# Patient Record
Sex: FEMALE
[De-identification: names, ages, dates, MRNs, and addresses within clinical notes are randomized per-mention and may not be internally consistent; named-entity substitution may affect disease eponyms.]

---

## 2017-08-15 PROBLEM — Z00.00 ENCOUNTER FOR PREVENTIVE HEALTH EXAMINATION: Status: ACTIVE | Noted: 2017-08-15

## 2017-09-09 ENCOUNTER — APPOINTMENT (OUTPATIENT)
Dept: OBGYN | Facility: CLINIC | Age: 37
End: 2017-09-09
Payer: COMMERCIAL

## 2017-09-09 VITALS
DIASTOLIC BLOOD PRESSURE: 56 MMHG | WEIGHT: 174 LBS | HEIGHT: 64 IN | BODY MASS INDEX: 29.71 KG/M2 | SYSTOLIC BLOOD PRESSURE: 110 MMHG

## 2017-09-09 DIAGNOSIS — Z83.3 FAMILY HISTORY OF DIABETES MELLITUS: ICD-10-CM

## 2017-09-09 DIAGNOSIS — Z78.9 OTHER SPECIFIED HEALTH STATUS: ICD-10-CM

## 2017-09-09 DIAGNOSIS — A74.9 CHLAMYDIAL INFECTION, UNSPECIFIED: ICD-10-CM

## 2017-09-09 LAB
BILIRUB UR QL STRIP: NORMAL
GLUCOSE UR-MCNC: NORMAL
HCG UR QL: 0.2 EU/DL
HCG UR QL: POSITIVE
HGB UR QL STRIP.AUTO: NORMAL
KETONES UR-MCNC: NORMAL
LEUKOCYTE ESTERASE UR QL STRIP: NORMAL
NITRITE UR QL STRIP: NORMAL
PH UR STRIP: 7
PROT UR STRIP-MCNC: NORMAL
QUALITY CONTROL: YES
SP GR UR STRIP: 1.02

## 2017-09-09 PROCEDURE — 0501F PRENATAL FLOW SHEET: CPT

## 2017-09-09 PROCEDURE — 81003 URINALYSIS AUTO W/O SCOPE: CPT | Mod: QW

## 2017-09-09 PROCEDURE — 81025 URINE PREGNANCY TEST: CPT

## 2017-09-09 PROCEDURE — 76801 OB US < 14 WKS SINGLE FETUS: CPT

## 2017-09-09 RX ORDER — MULTIVIT 47/IRON/FOLATE 1/DHA 27-1-300MG
27-0.6-0.4-3 CAPSULE ORAL
Qty: 30 | Refills: 11 | Status: ACTIVE | COMMUNITY
Start: 2017-09-09 | End: 1900-01-01

## 2017-09-10 LAB
C TRACH RRNA SPEC QL NAA+PROBE: NORMAL
N GONORRHOEA RRNA SPEC QL NAA+PROBE: NORMAL
SOURCE AMPLIFICATION: NORMAL

## 2017-09-11 LAB — BACTERIA UR CULT: NORMAL

## 2017-09-28 ENCOUNTER — NON-APPOINTMENT (OUTPATIENT)
Age: 37
End: 2017-09-28

## 2017-09-29 ENCOUNTER — APPOINTMENT (OUTPATIENT)
Dept: OBGYN | Facility: CLINIC | Age: 37
End: 2017-09-29
Payer: COMMERCIAL

## 2017-09-29 ENCOUNTER — NON-APPOINTMENT (OUTPATIENT)
Age: 37
End: 2017-09-29

## 2017-09-29 VITALS
DIASTOLIC BLOOD PRESSURE: 70 MMHG | HEIGHT: 64 IN | BODY MASS INDEX: 29.88 KG/M2 | SYSTOLIC BLOOD PRESSURE: 110 MMHG | WEIGHT: 175 LBS

## 2017-09-29 LAB
BILIRUB UR QL STRIP: NORMAL
GLUCOSE UR-MCNC: NORMAL
HCG UR QL: 1 EU/DL
HGB UR QL STRIP.AUTO: NORMAL
KETONES UR-MCNC: NORMAL
LEUKOCYTE ESTERASE UR QL STRIP: NORMAL
NITRITE UR QL STRIP: NORMAL
PH UR STRIP: 6
PROT UR STRIP-MCNC: NORMAL
SP GR UR STRIP: 1.03

## 2017-09-29 PROCEDURE — 0502F SUBSEQUENT PRENATAL CARE: CPT

## 2017-09-29 PROCEDURE — 76801 OB US < 14 WKS SINGLE FETUS: CPT

## 2017-09-29 PROCEDURE — 36415 COLL VENOUS BLD VENIPUNCTURE: CPT

## 2017-10-02 LAB
ABO + RH PNL BLD: NORMAL
BASOPHILS # BLD AUTO: 0.02 K/UL
BASOPHILS NFR BLD AUTO: 0.1 %
BLD GP AB SCN SERPL QL: NORMAL
EOSINOPHIL # BLD AUTO: 0.18 K/UL
EOSINOPHIL NFR BLD AUTO: 1.3 %
HBV SURFACE AG SERPL QL IA: NONREACTIVE
HCT VFR BLD CALC: 38.8 %
HCV AB SER QL: NONREACTIVE
HCV S/CO RATIO: 0.09 S/CO
HGB BLD-MCNC: 12.4 G/DL
HIV1+2 AB SPEC QL IA.RAPID: NONREACTIVE
IMM GRANULOCYTES NFR BLD AUTO: 0.4 %
LYMPHOCYTES # BLD AUTO: 2.93 K/UL
LYMPHOCYTES NFR BLD AUTO: 20.6 %
MAN DIFF?: NORMAL
MCHC RBC-ENTMCNC: 30.1 PG
MCHC RBC-ENTMCNC: 32 GM/DL
MCV RBC AUTO: 94.2 FL
MONOCYTES # BLD AUTO: 0.58 K/UL
MONOCYTES NFR BLD AUTO: 4.1 %
NEUTROPHILS # BLD AUTO: 10.45 K/UL
NEUTROPHILS NFR BLD AUTO: 73.5 %
PLATELET # BLD AUTO: 262 K/UL
RBC # BLD: 4.12 M/UL
RBC # FLD: 14.9 %
RUBV IGG FLD-ACNC: 10.9 INDEX
RUBV IGG SER-IMP: POSITIVE
T PALLIDUM AB SER QL IA: NEGATIVE
TSH SERPL-ACNC: 0.01 UIU/ML
VZV AB TITR SER: POSITIVE
VZV IGG SER IF-ACNC: 2024 INDEX
WBC # FLD AUTO: 14.22 K/UL

## 2017-10-04 LAB
B19V IGG SER QL IA: 0.3 INDEX
B19V IGG+IGM SER-IMP: NEGATIVE
B19V IGG+IGM SER-IMP: NORMAL
B19V IGM FLD-ACNC: 0.2 INDEX
B19V IGM SER-ACNC: NEGATIVE
HGB A MFR BLD: 97.1 %
HGB A2 MFR BLD: 2.9 %
HGB FRACT BLD-IMP: NORMAL
LEAD BLD-MCNC: <1 UG/DL

## 2017-10-27 ENCOUNTER — APPOINTMENT (OUTPATIENT)
Dept: OBGYN | Facility: CLINIC | Age: 37
End: 2017-10-27
Payer: COMMERCIAL

## 2017-10-27 ENCOUNTER — NON-APPOINTMENT (OUTPATIENT)
Age: 37
End: 2017-10-27

## 2017-10-27 VITALS
WEIGHT: 182 LBS | HEIGHT: 64 IN | SYSTOLIC BLOOD PRESSURE: 100 MMHG | BODY MASS INDEX: 31.07 KG/M2 | DIASTOLIC BLOOD PRESSURE: 60 MMHG

## 2017-10-27 PROCEDURE — 90471 IMMUNIZATION ADMIN: CPT

## 2017-10-27 PROCEDURE — 0502F SUBSEQUENT PRENATAL CARE: CPT

## 2017-10-27 PROCEDURE — 90688 IIV4 VACCINE SPLT 0.5 ML IM: CPT

## 2017-10-28 ENCOUNTER — NON-APPOINTMENT (OUTPATIENT)
Age: 37
End: 2017-10-28

## 2017-10-28 LAB
BILIRUB UR QL STRIP: NORMAL
GLUCOSE UR-MCNC: NORMAL
HCG UR QL: 2 EU/DL
HGB UR QL STRIP.AUTO: NORMAL
KETONES UR-MCNC: NORMAL
LEUKOCYTE ESTERASE UR QL STRIP: NORMAL
NITRITE UR QL STRIP: NORMAL
PH UR STRIP: 6.5
PROT UR STRIP-MCNC: NORMAL
SP GR UR STRIP: 1.03

## 2017-11-09 ENCOUNTER — OTHER (OUTPATIENT)
Age: 37
End: 2017-11-09

## 2017-12-01 ENCOUNTER — NON-APPOINTMENT (OUTPATIENT)
Age: 37
End: 2017-12-01

## 2017-12-01 ENCOUNTER — APPOINTMENT (OUTPATIENT)
Dept: OBGYN | Facility: CLINIC | Age: 37
End: 2017-12-01
Payer: COMMERCIAL

## 2017-12-01 VITALS
BODY MASS INDEX: 31.92 KG/M2 | HEIGHT: 64 IN | DIASTOLIC BLOOD PRESSURE: 58 MMHG | WEIGHT: 187 LBS | SYSTOLIC BLOOD PRESSURE: 110 MMHG

## 2017-12-01 LAB
BILIRUB UR QL STRIP: NORMAL
GLUCOSE UR-MCNC: NORMAL
HCG UR QL: 0.2 EU/DL
HGB UR QL STRIP.AUTO: NORMAL
KETONES UR-MCNC: NORMAL
LEUKOCYTE ESTERASE UR QL STRIP: NORMAL
NITRITE UR QL STRIP: NORMAL
PH UR STRIP: 7
PROT UR STRIP-MCNC: NORMAL
SP GR UR STRIP: 1.02

## 2017-12-01 PROCEDURE — 36415 COLL VENOUS BLD VENIPUNCTURE: CPT

## 2017-12-01 PROCEDURE — 0502F SUBSEQUENT PRENATAL CARE: CPT

## 2017-12-04 LAB
2ND TRIMESTER DATA: NORMAL
AFP PNL SERPL: NORMAL
AFP SERPL-ACNC: NORMAL
CLINICAL BIOCHEMIST REVIEW: NORMAL
NOTES NTD: NORMAL

## 2017-12-06 ENCOUNTER — APPOINTMENT (OUTPATIENT)
Dept: ANTEPARTUM | Facility: CLINIC | Age: 37
End: 2017-12-06
Payer: COMMERCIAL

## 2017-12-06 ENCOUNTER — ASOB RESULT (OUTPATIENT)
Age: 37
End: 2017-12-06

## 2017-12-06 PROCEDURE — 76811 OB US DETAILED SNGL FETUS: CPT

## 2017-12-19 ENCOUNTER — OUTPATIENT (OUTPATIENT)
Dept: OUTPATIENT SERVICES | Age: 37
LOS: 1 days | Discharge: HOME | End: 2017-12-19

## 2017-12-26 ENCOUNTER — APPOINTMENT (OUTPATIENT)
Dept: PEDIATRIC CARDIOLOGY | Facility: CLINIC | Age: 37
End: 2017-12-26
Payer: COMMERCIAL

## 2017-12-26 PROCEDURE — 76827 ECHO EXAM OF FETAL HEART: CPT

## 2017-12-26 PROCEDURE — 93325 DOPPLER ECHO COLOR FLOW MAPG: CPT | Mod: 59

## 2017-12-26 PROCEDURE — 76825 ECHO EXAM OF FETAL HEART: CPT

## 2017-12-26 PROCEDURE — 99241 OFFICE CONSULTATION NEW/ESTAB PATIENT 15 MIN: CPT | Mod: 25

## 2017-12-26 PROCEDURE — 76820 UMBILICAL ARTERY ECHO: CPT

## 2018-01-02 ENCOUNTER — APPOINTMENT (OUTPATIENT)
Dept: OBGYN | Facility: CLINIC | Age: 38
End: 2018-01-02
Payer: COMMERCIAL

## 2018-01-02 ENCOUNTER — NON-APPOINTMENT (OUTPATIENT)
Age: 38
End: 2018-01-02

## 2018-01-02 VITALS
BODY MASS INDEX: 32.61 KG/M2 | WEIGHT: 191 LBS | HEIGHT: 64 IN | SYSTOLIC BLOOD PRESSURE: 102 MMHG | DIASTOLIC BLOOD PRESSURE: 54 MMHG

## 2018-01-02 LAB
BILIRUB UR QL STRIP: NORMAL
GLUCOSE UR-MCNC: NORMAL
HCG UR QL: 0.2 EU/DL
HGB UR QL STRIP.AUTO: NORMAL
KETONES UR-MCNC: NORMAL
LEUKOCYTE ESTERASE UR QL STRIP: NORMAL
NITRITE UR QL STRIP: NORMAL
PH UR STRIP: 7.5
PROT UR STRIP-MCNC: NORMAL
SP GR UR STRIP: 1.02

## 2018-01-02 PROCEDURE — 0502F SUBSEQUENT PRENATAL CARE: CPT

## 2018-01-04 ENCOUNTER — NON-APPOINTMENT (OUTPATIENT)
Age: 38
End: 2018-01-04

## 2018-01-30 ENCOUNTER — NON-APPOINTMENT (OUTPATIENT)
Age: 38
End: 2018-01-30

## 2018-01-30 ENCOUNTER — APPOINTMENT (OUTPATIENT)
Dept: OBGYN | Facility: CLINIC | Age: 38
End: 2018-01-30
Payer: COMMERCIAL

## 2018-01-30 VITALS
BODY MASS INDEX: 33.46 KG/M2 | HEIGHT: 64 IN | DIASTOLIC BLOOD PRESSURE: 60 MMHG | SYSTOLIC BLOOD PRESSURE: 118 MMHG | WEIGHT: 196 LBS

## 2018-01-30 DIAGNOSIS — O21.9 VOMITING OF PREGNANCY, UNSPECIFIED: ICD-10-CM

## 2018-01-30 DIAGNOSIS — O36.80X0 PREGNANCY WITH INCONCLUSIVE FETAL VIABILITY, NOT APPLICABLE OR UNSPECIFIED: ICD-10-CM

## 2018-01-30 DIAGNOSIS — O09.521 SUPERVISION OF ELDERLY MULTIGRAVIDA, FIRST TRIMESTER: ICD-10-CM

## 2018-01-30 DIAGNOSIS — Z92.29 PERSONAL HISTORY OF OTHER DRUG THERAPY: ICD-10-CM

## 2018-01-30 LAB
BILIRUB UR QL STRIP: NORMAL
GLUCOSE UR-MCNC: NORMAL
HCG UR QL: 0.2 EU/DL
HGB UR QL STRIP.AUTO: NORMAL
KETONES UR-MCNC: NORMAL
LEUKOCYTE ESTERASE UR QL STRIP: NORMAL
NITRITE UR QL STRIP: NORMAL
PH UR STRIP: 6.5
PROT UR STRIP-MCNC: NORMAL
SP GR UR STRIP: 1.02

## 2018-01-30 PROCEDURE — 0502F SUBSEQUENT PRENATAL CARE: CPT

## 2018-01-30 PROCEDURE — 36415 COLL VENOUS BLD VENIPUNCTURE: CPT

## 2018-01-30 RX ORDER — ONDANSETRON 8 MG/1
8 TABLET ORAL
Qty: 60 | Refills: 1 | Status: DISCONTINUED | COMMUNITY
Start: 2017-09-12 | End: 2018-01-30

## 2018-02-01 LAB
BASOPHILS # BLD AUTO: 0.01 K/UL
BASOPHILS NFR BLD AUTO: 0.1 %
EOSINOPHIL # BLD AUTO: 0.06 K/UL
EOSINOPHIL NFR BLD AUTO: 0.5 %
GLUCOSE 1H P 50 G GLC PO SERPL-MCNC: 124 MG/DL
HCT VFR BLD CALC: 34.2 %
HGB BLD-MCNC: 11 G/DL
IMM GRANULOCYTES NFR BLD AUTO: 0.4 %
LYMPHOCYTES # BLD AUTO: 2.15 K/UL
LYMPHOCYTES NFR BLD AUTO: 18 %
MAN DIFF?: NORMAL
MCHC RBC-ENTMCNC: 29.3 PG
MCHC RBC-ENTMCNC: 32.2 GM/DL
MCV RBC AUTO: 91 FL
MONOCYTES # BLD AUTO: 0.68 K/UL
MONOCYTES NFR BLD AUTO: 5.7 %
NEUTROPHILS # BLD AUTO: 9 K/UL
NEUTROPHILS NFR BLD AUTO: 75.3 %
PLATELET # BLD AUTO: 231 K/UL
RBC # BLD: 3.76 M/UL
RBC # FLD: 14.8 %
WBC # FLD AUTO: 11.95 K/UL

## 2018-02-14 ENCOUNTER — ASOB RESULT (OUTPATIENT)
Age: 38
End: 2018-02-14

## 2018-02-14 ENCOUNTER — APPOINTMENT (OUTPATIENT)
Dept: ANTEPARTUM | Facility: CLINIC | Age: 38
End: 2018-02-14
Payer: COMMERCIAL

## 2018-02-14 PROCEDURE — 76816 OB US FOLLOW-UP PER FETUS: CPT

## 2018-02-14 PROCEDURE — 76819 FETAL BIOPHYS PROFIL W/O NST: CPT

## 2018-02-27 ENCOUNTER — APPOINTMENT (OUTPATIENT)
Dept: OBGYN | Facility: CLINIC | Age: 38
End: 2018-02-27
Payer: COMMERCIAL

## 2018-02-27 ENCOUNTER — NON-APPOINTMENT (OUTPATIENT)
Age: 38
End: 2018-02-27

## 2018-02-27 VITALS
WEIGHT: 196 LBS | HEIGHT: 64 IN | SYSTOLIC BLOOD PRESSURE: 98 MMHG | DIASTOLIC BLOOD PRESSURE: 60 MMHG | BODY MASS INDEX: 33.46 KG/M2

## 2018-02-27 PROCEDURE — 0502F SUBSEQUENT PRENATAL CARE: CPT

## 2018-02-28 ENCOUNTER — NON-APPOINTMENT (OUTPATIENT)
Age: 38
End: 2018-02-28

## 2018-03-12 ENCOUNTER — APPOINTMENT (OUTPATIENT)
Dept: ANTEPARTUM | Facility: CLINIC | Age: 38
End: 2018-03-12
Payer: COMMERCIAL

## 2018-03-12 ENCOUNTER — ASOB RESULT (OUTPATIENT)
Age: 38
End: 2018-03-12

## 2018-03-12 PROCEDURE — 76819 FETAL BIOPHYS PROFIL W/O NST: CPT

## 2018-03-12 PROCEDURE — 76816 OB US FOLLOW-UP PER FETUS: CPT

## 2018-04-03 ENCOUNTER — APPOINTMENT (OUTPATIENT)
Dept: OBGYN | Facility: CLINIC | Age: 38
End: 2018-04-03
Payer: COMMERCIAL

## 2018-04-03 VITALS
WEIGHT: 200 LBS | BODY MASS INDEX: 34.15 KG/M2 | SYSTOLIC BLOOD PRESSURE: 122 MMHG | HEIGHT: 64 IN | DIASTOLIC BLOOD PRESSURE: 80 MMHG

## 2018-04-03 LAB
BILIRUB UR QL STRIP: NORMAL
GLUCOSE UR-MCNC: NORMAL
HCG UR QL: 0.2 EU/DL
HGB UR QL STRIP.AUTO: NORMAL
KETONES UR-MCNC: NORMAL
LEUKOCYTE ESTERASE UR QL STRIP: NORMAL
NITRITE UR QL STRIP: NORMAL
PH UR STRIP: 7.5
PROT UR STRIP-MCNC: NORMAL
SP GR UR STRIP: 1.01

## 2018-04-03 PROCEDURE — 0502F SUBSEQUENT PRENATAL CARE: CPT

## 2018-04-03 PROCEDURE — 90715 TDAP VACCINE 7 YRS/> IM: CPT

## 2018-04-03 PROCEDURE — 90471 IMMUNIZATION ADMIN: CPT

## 2018-04-03 PROCEDURE — 36415 COLL VENOUS BLD VENIPUNCTURE: CPT

## 2018-04-04 LAB
BASOPHILS # BLD AUTO: 0.01 K/UL
BASOPHILS NFR BLD AUTO: 0.1 %
EOSINOPHIL # BLD AUTO: 0.05 K/UL
EOSINOPHIL NFR BLD AUTO: 0.5 %
HCT VFR BLD CALC: 37.1 %
HGB BLD-MCNC: 11.8 G/DL
HIV1+2 AB SPEC QL IA.RAPID: NONREACTIVE
IMM GRANULOCYTES NFR BLD AUTO: 0.7 %
LYMPHOCYTES # BLD AUTO: 2.47 K/UL
LYMPHOCYTES NFR BLD AUTO: 23.3 %
MAN DIFF?: NORMAL
MCHC RBC-ENTMCNC: 29.6 PG
MCHC RBC-ENTMCNC: 31.8 GM/DL
MCV RBC AUTO: 93.2 FL
MONOCYTES # BLD AUTO: 0.76 K/UL
MONOCYTES NFR BLD AUTO: 7.2 %
NEUTROPHILS # BLD AUTO: 7.25 K/UL
NEUTROPHILS NFR BLD AUTO: 68.2 %
PLATELET # BLD AUTO: 184 K/UL
RBC # BLD: 3.98 M/UL
RBC # FLD: 15.2 %
WBC # FLD AUTO: 10.61 K/UL

## 2018-04-05 ENCOUNTER — NON-APPOINTMENT (OUTPATIENT)
Age: 38
End: 2018-04-05

## 2018-04-05 LAB
GP B STREP DNA SPEC QL NAA+PROBE: NORMAL
GP B STREP DNA SPEC QL NAA+PROBE: NOT DETECTED
SOURCE GBS: NORMAL

## 2018-04-09 ENCOUNTER — ASOB RESULT (OUTPATIENT)
Age: 38
End: 2018-04-09

## 2018-04-09 ENCOUNTER — APPOINTMENT (OUTPATIENT)
Dept: ANTEPARTUM | Facility: CLINIC | Age: 38
End: 2018-04-09
Payer: COMMERCIAL

## 2018-04-09 PROCEDURE — 76819 FETAL BIOPHYS PROFIL W/O NST: CPT

## 2018-04-09 PROCEDURE — 76816 OB US FOLLOW-UP PER FETUS: CPT

## 2018-04-10 ENCOUNTER — APPOINTMENT (OUTPATIENT)
Dept: OBGYN | Facility: CLINIC | Age: 38
End: 2018-04-10
Payer: COMMERCIAL

## 2018-04-10 ENCOUNTER — NON-APPOINTMENT (OUTPATIENT)
Age: 38
End: 2018-04-10

## 2018-04-10 VITALS
WEIGHT: 200 LBS | HEIGHT: 64 IN | DIASTOLIC BLOOD PRESSURE: 60 MMHG | SYSTOLIC BLOOD PRESSURE: 94 MMHG | BODY MASS INDEX: 34.15 KG/M2

## 2018-04-10 DIAGNOSIS — O09.819 SUPERVISION OF PREGNANCY RESULTING FROM ASSISTED REPRODUCTIVE TECHNOLOGY, UNSPECIFIED TRIMESTER: ICD-10-CM

## 2018-04-10 DIAGNOSIS — Z23 ENCOUNTER FOR IMMUNIZATION: ICD-10-CM

## 2018-04-10 DIAGNOSIS — K64.9 UNSPECIFIED HEMORRHOIDS: ICD-10-CM

## 2018-04-10 PROCEDURE — 0502F SUBSEQUENT PRENATAL CARE: CPT

## 2018-04-10 RX ORDER — DOCUSATE SODIUM 100 MG/1
100 CAPSULE ORAL TWICE DAILY
Qty: 60 | Refills: 11 | Status: ACTIVE | COMMUNITY
Start: 2018-04-10 | End: 1900-01-01

## 2018-04-10 RX ORDER — LIDOCAINE HCL AND HYDROCORTISONE ACETATE 20; 20 MG/G; MG/G
2-2 CREAM RECTAL
Qty: 1 | Refills: 0 | Status: ACTIVE | COMMUNITY
Start: 2018-04-10 | End: 1900-01-01

## 2018-04-10 RX ORDER — WITCH HAZEL 500 MG/ML
SOLUTION RECTAL; TOPICAL
Qty: 1 | Refills: 2 | Status: ACTIVE | COMMUNITY
Start: 2018-04-10 | End: 1900-01-01

## 2018-04-14 ENCOUNTER — RX RENEWAL (OUTPATIENT)
Age: 38
End: 2018-04-14

## 2018-04-14 RX ORDER — LIDOCAINE HCL AND HYDROCORTISONE ACETATE 30; 5 MG/G; MG/G
3-0.5 CREAM RECTAL; TOPICAL
Qty: 28.35 | Refills: 0 | Status: ACTIVE | COMMUNITY
Start: 2018-04-14 | End: 1900-01-01

## 2018-04-17 ENCOUNTER — APPOINTMENT (OUTPATIENT)
Dept: OBGYN | Facility: CLINIC | Age: 38
End: 2018-04-17
Payer: COMMERCIAL

## 2018-04-17 ENCOUNTER — NON-APPOINTMENT (OUTPATIENT)
Age: 38
End: 2018-04-17

## 2018-04-17 VITALS
DIASTOLIC BLOOD PRESSURE: 60 MMHG | SYSTOLIC BLOOD PRESSURE: 100 MMHG | WEIGHT: 200 LBS | BODY MASS INDEX: 34.15 KG/M2 | HEIGHT: 64 IN

## 2018-04-17 DIAGNOSIS — O43.129 VELAMENTOUS INSERTION OF UMBILICAL CORD, UNSPECIFIED TRIMESTER: ICD-10-CM

## 2018-04-17 DIAGNOSIS — O09.529 SUPERVISION OF ELDERLY MULTIGRAVIDA, UNSPECIFIED TRIMESTER: ICD-10-CM

## 2018-04-17 LAB
BILIRUB UR QL STRIP: NORMAL
GLUCOSE UR-MCNC: NORMAL
HCG UR QL: 0.2 EU/DL
HGB UR QL STRIP.AUTO: NORMAL
KETONES UR-MCNC: NORMAL
LEUKOCYTE ESTERASE UR QL STRIP: NORMAL
NITRITE UR QL STRIP: NORMAL
PH UR STRIP: 7
PROT UR STRIP-MCNC: NORMAL
SP GR UR STRIP: 1.01

## 2018-04-17 PROCEDURE — 0502F SUBSEQUENT PRENATAL CARE: CPT

## 2018-04-19 ENCOUNTER — INPATIENT (INPATIENT)
Facility: HOSPITAL | Age: 38
LOS: 1 days | Discharge: HOME | End: 2018-04-21
Attending: OBSTETRICS & GYNECOLOGY | Admitting: OBSTETRICS & GYNECOLOGY
Payer: COMMERCIAL

## 2018-04-19 ENCOUNTER — EMERGENCY (EMERGENCY)
Facility: HOSPITAL | Age: 38
LOS: 0 days | Discharge: ANOTHER HOSPITAL | End: 2018-04-19
Attending: EMERGENCY MEDICINE
Payer: COMMERCIAL

## 2018-04-19 ENCOUNTER — NON-APPOINTMENT (OUTPATIENT)
Age: 38
End: 2018-04-19

## 2018-04-19 VITALS
RESPIRATION RATE: 16 BRPM | DIASTOLIC BLOOD PRESSURE: 59 MMHG | HEART RATE: 93 BPM | SYSTOLIC BLOOD PRESSURE: 116 MMHG | TEMPERATURE: 98 F

## 2018-04-19 VITALS
HEART RATE: 94 BPM | WEIGHT: 199.96 LBS | DIASTOLIC BLOOD PRESSURE: 86 MMHG | SYSTOLIC BLOOD PRESSURE: 119 MMHG | HEIGHT: 64 IN | RESPIRATION RATE: 16 BRPM | TEMPERATURE: 98 F | OXYGEN SATURATION: 98 %

## 2018-04-19 DIAGNOSIS — O26.899 OTHER SPECIFIED PREGNANCY RELATED CONDITIONS, UNSPECIFIED TRIMESTER: ICD-10-CM

## 2018-04-19 DIAGNOSIS — Z3A.00 WEEKS OF GESTATION OF PREGNANCY NOT SPECIFIED: ICD-10-CM

## 2018-04-19 LAB
BASOPHILS # BLD AUTO: 0.03 K/UL — SIGNIFICANT CHANGE UP (ref 0–0.2)
BASOPHILS NFR BLD AUTO: 0.2 % — SIGNIFICANT CHANGE UP (ref 0–2)
BLD GP AB SCN SERPL QL: NEGATIVE — SIGNIFICANT CHANGE UP
EOSINOPHIL # BLD AUTO: 0.03 K/UL — SIGNIFICANT CHANGE UP (ref 0–0.5)
EOSINOPHIL NFR BLD AUTO: 0.2 % — SIGNIFICANT CHANGE UP (ref 0–6)
HCT VFR BLD CALC: 39.3 % — SIGNIFICANT CHANGE UP (ref 34.5–45)
HGB BLD-MCNC: 12.5 G/DL — SIGNIFICANT CHANGE UP (ref 11.5–15.5)
IMM GRANULOCYTES # BLD AUTO: 0.16 # — SIGNIFICANT CHANGE UP
IMM GRANULOCYTES NFR BLD AUTO: 1.3 % — SIGNIFICANT CHANGE UP (ref 0–1.5)
LYMPHOCYTES # BLD AUTO: 17.2 % — SIGNIFICANT CHANGE UP (ref 13–44)
LYMPHOCYTES # BLD AUTO: 2.16 K/UL — SIGNIFICANT CHANGE UP (ref 1–3.3)
MCHC RBC-ENTMCNC: 29.6 PG — SIGNIFICANT CHANGE UP (ref 27–34)
MCHC RBC-ENTMCNC: 31.8 % — LOW (ref 32–36)
MCV RBC AUTO: 92.9 FL — SIGNIFICANT CHANGE UP (ref 80–100)
MONOCYTES # BLD AUTO: 0.76 K/UL — SIGNIFICANT CHANGE UP (ref 0–0.9)
MONOCYTES NFR BLD AUTO: 6.1 % — SIGNIFICANT CHANGE UP (ref 2–14)
NEUTROPHILS # BLD AUTO: 9.4 K/UL — HIGH (ref 1.8–7.4)
NEUTROPHILS NFR BLD AUTO: 75 % — SIGNIFICANT CHANGE UP (ref 43–77)
NRBC # FLD: 0 — SIGNIFICANT CHANGE UP
PLATELET # BLD AUTO: 202 K/UL — SIGNIFICANT CHANGE UP (ref 150–400)
PMV BLD: 12.4 FL — SIGNIFICANT CHANGE UP (ref 7–13)
RBC # BLD: 4.23 M/UL — SIGNIFICANT CHANGE UP (ref 3.8–5.2)
RBC # FLD: 14.9 % — HIGH (ref 10.3–14.5)
RH IG SCN BLD-IMP: POSITIVE — SIGNIFICANT CHANGE UP
RH IG SCN BLD-IMP: POSITIVE — SIGNIFICANT CHANGE UP
T PALLIDUM AB TITR SER: NEGATIVE — SIGNIFICANT CHANGE UP
WBC # BLD: 12.54 K/UL — HIGH (ref 3.8–10.5)
WBC # FLD AUTO: 12.54 K/UL — HIGH (ref 3.8–10.5)

## 2018-04-19 PROCEDURE — 99285 EMERGENCY DEPT VISIT HI MDM: CPT | Mod: 25

## 2018-04-19 PROCEDURE — 59400 OBSTETRICAL CARE: CPT | Mod: U9,GC

## 2018-04-19 RX ORDER — SODIUM CHLORIDE 9 MG/ML
3 INJECTION INTRAMUSCULAR; INTRAVENOUS; SUBCUTANEOUS EVERY 8 HOURS
Qty: 0 | Refills: 0 | Status: DISCONTINUED | OUTPATIENT
Start: 2018-04-19 | End: 2018-04-19

## 2018-04-19 RX ORDER — GLYCERIN ADULT
1 SUPPOSITORY, RECTAL RECTAL AT BEDTIME
Qty: 0 | Refills: 0 | Status: DISCONTINUED | OUTPATIENT
Start: 2018-04-19 | End: 2018-04-21

## 2018-04-19 RX ORDER — SODIUM CHLORIDE 9 MG/ML
3 INJECTION INTRAMUSCULAR; INTRAVENOUS; SUBCUTANEOUS EVERY 8 HOURS
Qty: 0 | Refills: 0 | Status: DISCONTINUED | OUTPATIENT
Start: 2018-04-19 | End: 2018-04-21

## 2018-04-19 RX ORDER — KETOROLAC TROMETHAMINE 30 MG/ML
30 SYRINGE (ML) INJECTION ONCE
Qty: 0 | Refills: 0 | Status: DISCONTINUED | OUTPATIENT
Start: 2018-04-19 | End: 2018-04-19

## 2018-04-19 RX ORDER — PRAMOXINE HYDROCHLORIDE 150 MG/15G
1 AEROSOL, FOAM RECTAL EVERY 4 HOURS
Qty: 0 | Refills: 0 | Status: DISCONTINUED | OUTPATIENT
Start: 2018-04-19 | End: 2018-04-19

## 2018-04-19 RX ORDER — OXYTOCIN 10 UNIT/ML
333.33 VIAL (ML) INJECTION
Qty: 20 | Refills: 0 | Status: COMPLETED | OUTPATIENT
Start: 2018-04-19

## 2018-04-19 RX ORDER — OXYCODONE HYDROCHLORIDE 5 MG/1
5 TABLET ORAL EVERY 4 HOURS
Qty: 0 | Refills: 0 | Status: DISCONTINUED | OUTPATIENT
Start: 2018-04-19 | End: 2018-04-21

## 2018-04-19 RX ORDER — OXYTOCIN 10 UNIT/ML
41.67 VIAL (ML) INJECTION
Qty: 20 | Refills: 0 | Status: DISCONTINUED | OUTPATIENT
Start: 2018-04-19 | End: 2018-04-19

## 2018-04-19 RX ORDER — AER TRAVELER 0.5 G/1
1 SOLUTION RECTAL; TOPICAL EVERY 4 HOURS
Qty: 0 | Refills: 0 | Status: DISCONTINUED | OUTPATIENT
Start: 2018-04-19 | End: 2018-04-21

## 2018-04-19 RX ORDER — IBUPROFEN 200 MG
600 TABLET ORAL EVERY 6 HOURS
Qty: 0 | Refills: 0 | Status: DISCONTINUED | OUTPATIENT
Start: 2018-04-19 | End: 2018-04-21

## 2018-04-19 RX ORDER — SODIUM CHLORIDE 9 MG/ML
1000 INJECTION, SOLUTION INTRAVENOUS
Qty: 0 | Refills: 0 | Status: DISCONTINUED | OUTPATIENT
Start: 2018-04-19 | End: 2018-04-19

## 2018-04-19 RX ORDER — DIPHENHYDRAMINE HCL 50 MG
25 CAPSULE ORAL EVERY 6 HOURS
Qty: 0 | Refills: 0 | Status: DISCONTINUED | OUTPATIENT
Start: 2018-04-19 | End: 2018-04-21

## 2018-04-19 RX ORDER — DIBUCAINE 1 %
1 OINTMENT (GRAM) RECTAL EVERY 4 HOURS
Qty: 0 | Refills: 0 | Status: DISCONTINUED | OUTPATIENT
Start: 2018-04-19 | End: 2018-04-19

## 2018-04-19 RX ORDER — SIMETHICONE 80 MG/1
80 TABLET, CHEWABLE ORAL EVERY 6 HOURS
Qty: 0 | Refills: 0 | Status: DISCONTINUED | OUTPATIENT
Start: 2018-04-19 | End: 2018-04-21

## 2018-04-19 RX ORDER — DOCUSATE SODIUM 100 MG
100 CAPSULE ORAL
Qty: 0 | Refills: 0 | Status: DISCONTINUED | OUTPATIENT
Start: 2018-04-19 | End: 2018-04-21

## 2018-04-19 RX ORDER — HYDROCORTISONE 1 %
1 OINTMENT (GRAM) TOPICAL EVERY 4 HOURS
Qty: 0 | Refills: 0 | Status: DISCONTINUED | OUTPATIENT
Start: 2018-04-19 | End: 2018-04-21

## 2018-04-19 RX ORDER — LANOLIN
1 OINTMENT (GRAM) TOPICAL EVERY 6 HOURS
Qty: 0 | Refills: 0 | Status: DISCONTINUED | OUTPATIENT
Start: 2018-04-19 | End: 2018-04-21

## 2018-04-19 RX ORDER — OXYTOCIN 10 UNIT/ML
333.33 VIAL (ML) INJECTION
Qty: 20 | Refills: 0 | Status: DISCONTINUED | OUTPATIENT
Start: 2018-04-19 | End: 2018-04-19

## 2018-04-19 RX ORDER — DIBUCAINE 1 %
1 OINTMENT (GRAM) RECTAL EVERY 4 HOURS
Qty: 0 | Refills: 0 | Status: DISCONTINUED | OUTPATIENT
Start: 2018-04-19 | End: 2018-04-21

## 2018-04-19 RX ORDER — IBUPROFEN 200 MG
600 TABLET ORAL EVERY 6 HOURS
Qty: 0 | Refills: 0 | Status: COMPLETED | OUTPATIENT
Start: 2018-04-19 | End: 2019-03-18

## 2018-04-19 RX ORDER — HYDROCORTISONE 1 %
1 OINTMENT (GRAM) TOPICAL EVERY 4 HOURS
Qty: 0 | Refills: 0 | Status: DISCONTINUED | OUTPATIENT
Start: 2018-04-19 | End: 2018-04-19

## 2018-04-19 RX ORDER — TETANUS TOXOID, REDUCED DIPHTHERIA TOXOID AND ACELLULAR PERTUSSIS VACCINE, ADSORBED 5; 2.5; 8; 8; 2.5 [IU]/.5ML; [IU]/.5ML; UG/.5ML; UG/.5ML; UG/.5ML
0.5 SUSPENSION INTRAMUSCULAR ONCE
Qty: 0 | Refills: 0 | Status: DISCONTINUED | OUTPATIENT
Start: 2018-04-19 | End: 2018-04-21

## 2018-04-19 RX ORDER — MAGNESIUM HYDROXIDE 400 MG/1
30 TABLET, CHEWABLE ORAL
Qty: 0 | Refills: 0 | Status: DISCONTINUED | OUTPATIENT
Start: 2018-04-19 | End: 2018-04-21

## 2018-04-19 RX ORDER — ACETAMINOPHEN 500 MG
975 TABLET ORAL EVERY 6 HOURS
Qty: 0 | Refills: 0 | Status: COMPLETED | OUTPATIENT
Start: 2018-04-19 | End: 2019-03-18

## 2018-04-19 RX ORDER — ACETAMINOPHEN 500 MG
975 TABLET ORAL EVERY 6 HOURS
Qty: 0 | Refills: 0 | Status: DISCONTINUED | OUTPATIENT
Start: 2018-04-19 | End: 2018-04-21

## 2018-04-19 RX ORDER — AER TRAVELER 0.5 G/1
1 SOLUTION RECTAL; TOPICAL EVERY 4 HOURS
Qty: 0 | Refills: 0 | Status: DISCONTINUED | OUTPATIENT
Start: 2018-04-19 | End: 2018-04-19

## 2018-04-19 RX ORDER — PRAMOXINE HYDROCHLORIDE 150 MG/15G
1 AEROSOL, FOAM RECTAL EVERY 4 HOURS
Qty: 0 | Refills: 0 | Status: DISCONTINUED | OUTPATIENT
Start: 2018-04-19 | End: 2018-04-21

## 2018-04-19 RX ORDER — OXYCODONE HYDROCHLORIDE 5 MG/1
5 TABLET ORAL
Qty: 0 | Refills: 0 | Status: DISCONTINUED | OUTPATIENT
Start: 2018-04-19 | End: 2018-04-21

## 2018-04-19 RX ADMIN — SODIUM CHLORIDE 250 MILLILITER(S): 9 INJECTION, SOLUTION INTRAVENOUS at 10:00

## 2018-04-19 RX ADMIN — Medication 100 MILLIGRAM(S): at 21:31

## 2018-04-19 RX ADMIN — Medication 1000 MILLIUNIT(S)/MIN: at 13:50

## 2018-04-19 RX ADMIN — Medication 30 MILLIGRAM(S): at 14:30

## 2018-04-19 RX ADMIN — Medication 975 MILLIGRAM(S): at 21:30

## 2018-04-19 RX ADMIN — Medication 600 MILLIGRAM(S): at 21:31

## 2018-04-19 RX ADMIN — Medication 125 MILLIUNIT(S)/MIN: at 14:42

## 2018-04-19 RX ADMIN — Medication 600 MILLIGRAM(S): at 22:30

## 2018-04-19 RX ADMIN — Medication 975 MILLIGRAM(S): at 22:30

## 2018-04-19 RX ADMIN — SODIUM CHLORIDE 3 MILLILITER(S): 9 INJECTION INTRAMUSCULAR; INTRAVENOUS; SUBCUTANEOUS at 22:30

## 2018-04-19 NOTE — ED PROVIDER NOTE - CONSTITUTIONAL, MLM
normal... Well appearing, well nourished, awake, alert, oriented to person, place, time/situation and in no apparent distress. Speaking in clear full sentences smiling appears very comfortable lying in the stretcher in a bright light room

## 2018-04-19 NOTE — ED ADULT TRIAGE NOTE - CHIEF COMPLAINT QUOTE
39 weeks pregnant with reports of water broke, contractions 8 to 9 minuted apart. due date is this coming Sunday.

## 2018-04-19 NOTE — ED ADULT NURSE NOTE - OBJECTIVE STATEMENT
pt walked in in the ED stated that "her water broke around 0630 this morning, higinio about 7 mins apart" Lmp late june or july

## 2018-04-19 NOTE — ED PROVIDER NOTE - CARE PLAN
Principal Discharge DX:	39 weeks gestation of pregnancy  Secondary Diagnosis:	Labor and delivery, indication for care

## 2018-04-19 NOTE — ED PROVIDER NOTE - OBJECTIVE STATEMENT
38 years old female walked in c/o she is 39 weeks pregnant due date on 18 and abdominal contractions started at 6:30 am 8 to 9 minutes apart and water broke. Pt is scheduled for delivery at [facility]. Pt sts she is currently comfortable now. Pt is . No problem in this pregnancy.

## 2018-04-19 NOTE — ED PROVIDER NOTE - PROGRESS NOTE DETAILS
Transfer center is called and pt's ob/ gyn Dr. Kay is paged. Dr. Waller ob/gyn accepts pt to [facility]

## 2018-04-20 ENCOUNTER — TRANSCRIPTION ENCOUNTER (OUTPATIENT)
Age: 38
End: 2018-04-20

## 2018-04-20 DIAGNOSIS — O60.03 PRETERM LABOR WITHOUT DELIVERY, THIRD TRIMESTER: ICD-10-CM

## 2018-04-20 DIAGNOSIS — Z3A.39 39 WEEKS GESTATION OF PREGNANCY: ICD-10-CM

## 2018-04-20 RX ORDER — HYDROCORTISONE 1 %
1 OINTMENT (GRAM) TOPICAL THREE TIMES A DAY
Qty: 0 | Refills: 0 | Status: DISCONTINUED | OUTPATIENT
Start: 2018-04-20 | End: 2018-04-21

## 2018-04-20 RX ADMIN — Medication 1 SUPPOSITORY(S): at 12:05

## 2018-04-20 RX ADMIN — Medication 600 MILLIGRAM(S): at 13:00

## 2018-04-20 RX ADMIN — Medication 1 SUPPOSITORY(S): at 22:42

## 2018-04-20 RX ADMIN — Medication 1 TABLET(S): at 12:03

## 2018-04-20 RX ADMIN — Medication 600 MILLIGRAM(S): at 06:40

## 2018-04-20 RX ADMIN — Medication 975 MILLIGRAM(S): at 07:21

## 2018-04-20 RX ADMIN — Medication 600 MILLIGRAM(S): at 18:17

## 2018-04-20 RX ADMIN — Medication 600 MILLIGRAM(S): at 19:02

## 2018-04-20 RX ADMIN — Medication 975 MILLIGRAM(S): at 06:40

## 2018-04-20 RX ADMIN — Medication 975 MILLIGRAM(S): at 13:00

## 2018-04-20 RX ADMIN — Medication 975 MILLIGRAM(S): at 12:03

## 2018-04-20 RX ADMIN — Medication 975 MILLIGRAM(S): at 18:17

## 2018-04-20 RX ADMIN — Medication 975 MILLIGRAM(S): at 19:02

## 2018-04-20 RX ADMIN — Medication 600 MILLIGRAM(S): at 12:03

## 2018-04-20 RX ADMIN — Medication 100 MILLIGRAM(S): at 12:03

## 2018-04-20 RX ADMIN — Medication 600 MILLIGRAM(S): at 07:21

## 2018-04-20 NOTE — PROGRESS NOTE ADULT - SUBJECTIVE AND OBJECTIVE BOX
S: Patient doing well. Minimal lochia. Pain controlled.  O: Vital Signs Last 24 Hrs  T(C): 36.8 (2018 06:37), Max: 37 (2018 16:00)  T(F): 98.2 (2018 06:37), Max: 98.6 (2018 16:00)  HR: 65 (2018 06:37) (65 - 94)  BP: 108/58 (2018 06:37) (101/61 - 116/59)  BP(mean): --  RR: 18 (2018 06:37) (16 - 18)  SpO2: 100% (2018 06:37) (98% - 100%)    Gen: NAD  Abd: soft, NT, ND, fundus firm below umbilicus  Lochia: moderate  Ext: no tenderness    Labs:                        12.5   12.54 )-----------( 202      ( 2018 11:26 )             39.3       ABO Interpretation: A ( @ 10:55)    Rh Interpretation: Positive ( @ 10:55)    Antibody Screen Negative      A: 38y PPD#1 s/p  doing well.     Plan: Continue routine postpartum care. Anticipate d/c home in am.   Reilly KEBEDE

## 2018-04-20 NOTE — DISCHARGE NOTE OB - CARE PROVIDER_API CALL
Helio Kay), Obstetrics and Gynecology  [address] [facility] [address]  [address] [place] [address]  Phone: [phone]  Fax: [phone]

## 2018-04-20 NOTE — DISCHARGE NOTE OB - MEDICATION SUMMARY - MEDICATIONS TO TAKE
I will START or STAY ON the medications listed below when I get home from the hospital:    acetaminophen 325 mg oral tablet  -- 3 tab(s) by mouth every 6 hours  -- Indication: For moderate pain    ibuprofen 600 mg oral tablet  -- 1 tab(s) by mouth every 6 hours  -- Indication: For moderate pain    Prenatabs Rx oral tablet  -- 1 tab(s) by mouth once a day  -- Indication: For vitamin supplement

## 2018-04-21 VITALS
DIASTOLIC BLOOD PRESSURE: 70 MMHG | TEMPERATURE: 98 F | SYSTOLIC BLOOD PRESSURE: 110 MMHG | OXYGEN SATURATION: 99 % | RESPIRATION RATE: 17 BRPM | HEART RATE: 77 BPM

## 2018-04-21 RX ORDER — ACETAMINOPHEN 500 MG
3 TABLET ORAL
Qty: 0 | Refills: 0 | COMMUNITY
Start: 2018-04-21

## 2018-04-21 RX ORDER — IBUPROFEN 200 MG
1 TABLET ORAL
Qty: 0 | Refills: 0 | COMMUNITY
Start: 2018-04-21

## 2018-04-21 RX ADMIN — Medication 600 MILLIGRAM(S): at 10:01

## 2018-04-21 RX ADMIN — Medication 1 TABLET(S): at 10:01

## 2018-04-21 RX ADMIN — Medication 600 MILLIGRAM(S): at 11:00

## 2018-04-21 RX ADMIN — Medication 600 MILLIGRAM(S): at 03:40

## 2018-04-21 RX ADMIN — Medication 600 MILLIGRAM(S): at 02:43

## 2018-04-21 RX ADMIN — Medication 975 MILLIGRAM(S): at 11:00

## 2018-04-21 RX ADMIN — Medication 975 MILLIGRAM(S): at 10:01

## 2018-04-24 ENCOUNTER — APPOINTMENT (OUTPATIENT)
Dept: OBGYN | Facility: CLINIC | Age: 38
End: 2018-04-24

## 2018-05-03 ENCOUNTER — TRANSCRIPTION ENCOUNTER (OUTPATIENT)
Age: 38
End: 2018-05-03

## 2018-06-02 ENCOUNTER — APPOINTMENT (OUTPATIENT)
Dept: OBGYN | Facility: CLINIC | Age: 38
End: 2018-06-02

## 2018-10-02 ENCOUNTER — APPOINTMENT (OUTPATIENT)
Dept: OBGYN | Facility: CLINIC | Age: 38
End: 2018-10-02

## 2019-04-10 NOTE — ED ADULT NURSE NOTE - FALLEN IN THE PAST
no Complex Repair And Dermal Autograft Text: The defect edges were debeveled with a #15 scalpel blade.  The primary defect was closed partially with a complex linear closure.  Given the location of the defect, shape of the defect and the proximity to free margins an dermal autograft was deemed most appropriate to repair the remaining defect.  The graft was trimmed to fit the size of the remaining defect.  The graft was then placed in the primary defect, oriented appropriately, and sutured into place.

## 2020-03-20 NOTE — PATIENT PROFILE OB - PRO BLOOD TYPE INFANT
Metoprolol   Last Office Visit: 2/12/19  Last Refill Date:3/11/2019  # 90          Refills  3      Ibuprofen   Last Office Visit: 2/12/19  Last Refill Date:8/15/19  # 60          Refills  1      HCTZ  Last Office Visit: 2/12/19  Last Refill Date:3/11/19  # 90          Refills  3      Thank you!    
A positive

## 2020-08-25 NOTE — DISCHARGE NOTE OB - BREASTFEEDING PROVIDES MATERNAL HEALTH BENEFITS, DECREASED PREMENOPAUSAL BREAST CANCER, OVARIAN CANCER AND TYPE II DIABETES MELLITUS

## 2022-06-23 NOTE — ED ADULT NURSE NOTE - CAS ED TRANSFER PT ACCEPT1 YN
Yes Wartpeel Counseling:  I discussed with the patient the risks of Wartpeel including but not limited to erythema, scaling, itching, weeping, crusting, and pain.

## 2022-10-06 NOTE — PATIENT PROFILE OB - PAIN SCALE PREFERRED, PROFILE
CONSTITUTIONAL: chills, fatigue  EYES: No eye pain, visual disturbances, or discharge  ENMT:  No difficulty hearing, tinnitus, vertigo; No sinus or throat pain  NECK: No pain or stiffness  RESPIRATORY: No cough, wheezing, chills or hemoptysis; No shortness of breath  CARDIOVASCULAR: No chest pain, palpitations, dizziness, or leg swelling  GASTROINTESTINAL: see HPI   GENITOURINARY: No dysuria, frequency, hematuria, or incontinence  NEUROLOGICAL: No headaches, memory loss, loss of strength, numbness, or tremors  SKIN: No itching, burning, rashes, or lesions   LYMPH NODES: No enlarged glands  ENDOCRINE: No heat or cold intolerance; No hair loss  MUSCULOSKELETAL: No joint pain or swelling; No muscle, back, or extremity pain  PSYCHIATRIC: No depression, anxiety, mood swings, or difficulty sleeping  HEME/LYMPH: No easy bruising, or bleeding gums  ALLERY AND IMMUNOLOGIC: No hives or eczema numerical 0-10

## 2025-02-27 ENCOUNTER — APPOINTMENT (OUTPATIENT)
Dept: NEUROLOGY | Facility: CLINIC | Age: 45
End: 2025-02-27